# Patient Record
Sex: FEMALE | Race: WHITE | NOT HISPANIC OR LATINO
[De-identification: names, ages, dates, MRNs, and addresses within clinical notes are randomized per-mention and may not be internally consistent; named-entity substitution may affect disease eponyms.]

---

## 2022-05-06 ENCOUNTER — FORM ENCOUNTER (OUTPATIENT)
Age: 77
End: 2022-05-06

## 2022-05-07 ENCOUNTER — APPOINTMENT (OUTPATIENT)
Dept: MRI IMAGING | Facility: CLINIC | Age: 77
End: 2022-05-07
Payer: MEDICARE

## 2022-05-07 ENCOUNTER — APPOINTMENT (OUTPATIENT)
Dept: ORTHOPEDIC SURGERY | Facility: CLINIC | Age: 77
End: 2022-05-07
Payer: MEDICARE

## 2022-05-07 VITALS — HEIGHT: 64 IN | WEIGHT: 168 LBS | BODY MASS INDEX: 28.68 KG/M2

## 2022-05-07 DIAGNOSIS — E11.9 TYPE 2 DIABETES MELLITUS W/OUT COMPLICATIONS: ICD-10-CM

## 2022-05-07 DIAGNOSIS — I10 ESSENTIAL (PRIMARY) HYPERTENSION: ICD-10-CM

## 2022-05-07 DIAGNOSIS — Z00.00 ENCOUNTER FOR GENERAL ADULT MEDICAL EXAMINATION W/OUT ABNORMAL FINDINGS: ICD-10-CM

## 2022-05-07 DIAGNOSIS — E78.00 PURE HYPERCHOLESTEROLEMIA, UNSPECIFIED: ICD-10-CM

## 2022-05-07 PROCEDURE — 73564 X-RAY EXAM KNEE 4 OR MORE: CPT | Mod: LT

## 2022-05-07 PROCEDURE — 99204 OFFICE O/P NEW MOD 45 MIN: CPT | Mod: 25

## 2022-05-07 PROCEDURE — 20610 DRAIN/INJ JOINT/BURSA W/O US: CPT | Mod: LT

## 2022-05-07 PROCEDURE — L1833: CPT

## 2022-05-07 PROCEDURE — 73721 MRI JNT OF LWR EXTRE W/O DYE: CPT | Mod: LT,MH

## 2022-05-07 NOTE — HISTORY OF PRESENT ILLNESS
[Left Leg] : left leg [8] : 8 [5] : 5 [Dull/Aching] : dull/aching [Localized] : localized [Intermittent] : intermittent [Standing] : standing [Walking] : walking [Stairs] : stairs [Retired] : Work status: retired [de-identified] : 76 f felt a pop walking in her house yesterday. unable to ambulate.  [] : Post Surgical Visit: no [FreeTextEntry5] : patient is here with leg pain since 5/6/22\par patient was walking to a room and felt a pop in her leg

## 2022-05-07 NOTE — IMAGING
[de-identified] : Left knee exam: \par \par General: no distress, no ligamentous laxity\par Skin: no significant pertinent finding\par Inspection: \par    Effusion: +\par    Malalignment: (-)\par    Swelling: (-)\par    Quad atrophy: (-)\par    J-sign: (-)\par ROM: \par    0 - 90 degrees of flexion.\par Tenderness: \par    MJLT: +\par    LJLT: +\par    Medial patellar facet tenderness: +\par    Lateral patellar facet tenderness: +\par Stability: \par    Lachman: (-)\par    Varus/Valgus instability: (-)\par    Posterior drawer: (-)\par Additional tests: \par \par Strength: 3/5 Q 5/5 H/TA/GS/EHL\par Neuro: In tact to light touch throughout, DTR's wnl\par Vascularity: Extremity warm and well perfused\par Gait: normal.\par \par \par  [Left] : left knee [All Views] : anteroposterior, lateral, skyline, and anteroposterior standing [FreeTextEntry9] : effusion, mod djd

## 2022-05-07 NOTE — ASSESSMENT
[FreeTextEntry1] : The patient was advised of the diagnosis.  The natural history of the pathology was explained in full. All questions were answered.  The risks and benefits of conservative and interventional treatment alternatives were explained to the patient\par \par sterilly aspited 40cc blood.   plan for ice, knee immobilizer. mri ro occult fracture. ro quad partial tear. fu p mri\par \par The patient was advised that an advanced imaging study (MRI/CT/etc) will be ordered to evaluate potential pathology in the affected area(s).  The patient was further advised to follow up in the office to review the results of the study and determine further management that may be indicated.\par \par

## 2022-05-10 ENCOUNTER — APPOINTMENT (OUTPATIENT)
Dept: ORTHOPEDIC SURGERY | Facility: CLINIC | Age: 77
End: 2022-05-10
Payer: MEDICARE

## 2022-05-10 VITALS — BODY MASS INDEX: 28.68 KG/M2 | WEIGHT: 168 LBS | HEIGHT: 64 IN

## 2022-05-10 PROCEDURE — 99214 OFFICE O/P EST MOD 30 MIN: CPT

## 2022-05-10 RX ORDER — ROSUVASTATIN CALCIUM 20 MG/1
20 TABLET, FILM COATED ORAL
Qty: 90 | Refills: 0 | Status: ACTIVE | COMMUNITY
Start: 2022-03-22

## 2022-05-10 RX ORDER — RAMIPRIL 10 MG/1
10 CAPSULE ORAL
Qty: 90 | Refills: 0 | Status: ACTIVE | COMMUNITY
Start: 2021-03-23

## 2022-05-10 RX ORDER — DICLOFENAC SODIUM 75 MG/1
75 TABLET, DELAYED RELEASE ORAL
Qty: 20 | Refills: 0 | Status: ACTIVE | COMMUNITY
Start: 2022-05-10 | End: 1900-01-01

## 2022-05-10 RX ORDER — AMLODIPINE BESYLATE 2.5 MG/1
2.5 TABLET ORAL
Qty: 90 | Refills: 0 | Status: ACTIVE | COMMUNITY
Start: 2022-03-22

## 2022-05-10 RX ORDER — CLOTRIMAZOLE AND BETAMETHASONE DIPROPIONATE 10; .5 MG/G; MG/G
1-0.05 CREAM TOPICAL
Qty: 45 | Refills: 0 | Status: ACTIVE | COMMUNITY
Start: 2021-12-01

## 2022-05-10 RX ORDER — CYCLOSPORINE 0.5 MG/ML
0.05 EMULSION OPHTHALMIC
Qty: 90 | Refills: 0 | Status: ACTIVE | COMMUNITY
Start: 2022-02-24

## 2022-05-10 RX ORDER — TRIAMTERENE AND HYDROCHLOROTHIAZIDE 25; 37.5 MG/1; MG/1
37.5-25 TABLET ORAL
Qty: 90 | Refills: 0 | Status: ACTIVE | COMMUNITY
Start: 2022-03-22

## 2022-05-10 RX ORDER — METFORMIN ER 500 MG 500 MG/1
500 TABLET ORAL
Qty: 180 | Refills: 0 | Status: ACTIVE | COMMUNITY
Start: 2022-04-29

## 2022-06-10 ENCOUNTER — APPOINTMENT (OUTPATIENT)
Dept: ORTHOPEDIC SURGERY | Facility: CLINIC | Age: 77
End: 2022-06-10
Payer: MEDICARE

## 2022-06-10 VITALS — HEIGHT: 64 IN | BODY MASS INDEX: 28.68 KG/M2 | WEIGHT: 168 LBS

## 2022-06-10 DIAGNOSIS — S89.92XA UNSPECIFIED INJURY OF LEFT LOWER LEG, INITIAL ENCOUNTER: ICD-10-CM

## 2022-06-10 PROCEDURE — 99213 OFFICE O/P EST LOW 20 MIN: CPT

## 2022-06-10 NOTE — HISTORY OF PRESENT ILLNESS
[3] : 3 [0] : 0 [de-identified] : 76 f felt a pop walking in her house yesterday. unable to ambulate. \par \par MRI L knee:1. 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 Tricompartmental chondral loss, joint space narrowing and osteophytes, subchondral edema in the medial compartment and patella laterally with effusion, synovitis, popliteal cyst, and chronic ligament sprains with MCL laxity and extensor mechanism tendinopathy.\par 3. 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 Disproportionate semimembranosus muscle atrophy.\par 5. No acute fracture.\par 6. Patient motion degrades image quality on multiple imaging sequences.\par \par signficiantly imrpoved in PT. mild pain. has pain in the right anteriror knee area.  [FreeTextEntry6] : no pain bc non weight bearing [de-identified] : physical therapy

## 2022-06-10 NOTE — ASSESSMENT
[FreeTextEntry1] : The patient was advised of the diagnosis.  The natural history of the pathology was explained in full. All questions were answered.  The risks and benefits of conservative and interventional treatment alternatives were explained to the patient\par \par The patient was advised that an advanced imaging study (MRI/CT/etc) will be ordered to evaluate potential pathology in the affected area(s).  The patient was further advised to follow up in the office to review the results of the study and determine further management that may be indicated.\par \par Recommend HA injections in bilateral knees in future. PT for now. fu 6wk prn

## 2022-06-10 NOTE — IMAGING
[de-identified] : Left knee exam: \par \par General: no distress, no ligamentous laxity\par Skin: no significant pertinent finding\par Inspection: \par    Effusion: +\par    Malalignment: (-)\par    Swelling: (-)\par    Quad atrophy: (-)\par    J-sign: (-)\par ROM: \par    0 - 90 degrees of flexion.\par Tenderness: \par    MJLT: min\par    LJLT: min\par    Medial patellar facet tenderness: +\par    Lateral patellar facet tenderness: +\par Stability: \par    Lachman: (-)\par    Varus/Valgus instability: (-)\par    Posterior drawer: (-)\par Additional tests: \par \par Strength: 5/5 Q 5/5 H/TA/GS/EHL\par Neuro: In tact to light touch throughout, DTR's wnl\par Vascularity: Extremity warm and well perfused\par Gait: mildly antalgic\par \par \par \par

## 2022-06-14 NOTE — HISTORY OF PRESENT ILLNESS
[0] : 0 [de-identified] : 76 f felt a pop walking in her house yesterday. unable to ambulate. \par \par 5/10/22: here to review mri\par \par MRI L knee:1. 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 Tricompartmental chondral loss, joint space narrowing and osteophytes, subchondral edema in the medial compartment and patella laterally with effusion, synovitis, popliteal cyst, and chronic ligament sprains with MCL laxity and extensor mechanism tendinopathy.\par 3. 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 Disproportionate semimembranosus muscle atrophy.\par 5. No acute fracture.\par 6. Patient motion degrades image quality on multiple imaging sequences. [FreeTextEntry6] : no pain bc non weight bearing

## 2022-06-14 NOTE — ASSESSMENT
[FreeTextEntry1] : The patient was advised of the diagnosis.  The natural history of the pathology was explained in full. All questions were answered.  The risks and benefits of conservative and interventional treatment alternatives were explained to the patient\par \par \par MRI(s) and/or other advanced imaging studies (CT/etc) were reviewed with the patient. Implications of the study(ies) together with the patient's clinical picture were discussed to formulate a working diagnosis and management options were detailed.\par \par PT. diclofenac. walker. fu 6w

## 2022-06-14 NOTE — IMAGING
[de-identified] : Left knee exam: \par \par General: no distress, no ligamentous laxity\par Skin: no significant pertinent finding\par Inspection: \par    Effusion: +\par    Malalignment: (-)\par    Swelling: (-)\par    Quad atrophy: (-)\par    J-sign: (-)\par ROM: \par    0 - 90 degrees of flexion.\par Tenderness: \par    MJLT: +\par    LJLT: +\par    Medial patellar facet tenderness: +\par    Lateral patellar facet tenderness: +\par Stability: \par    Lachman: (-)\par    Varus/Valgus instability: (-)\par    Posterior drawer: (-)\par Additional tests: \par \par Strength: 3/5 Q 5/5 H/TA/GS/EHL\par Neuro: In tact to light touch throughout, DTR's wnl\par Vascularity: Extremity warm and well perfused\par Gait: normal.\par \par \par

## 2022-12-30 ENCOUNTER — APPOINTMENT (OUTPATIENT)
Dept: ORTHOPEDIC SURGERY | Facility: CLINIC | Age: 77
End: 2022-12-30
Payer: MEDICARE

## 2022-12-30 DIAGNOSIS — S83.232D COMPLEX TEAR OF MEDIAL MENISCUS, CURRENT INJURY, LEFT KNEE, SUBSEQUENT ENCOUNTER: ICD-10-CM

## 2022-12-30 PROCEDURE — 20610 DRAIN/INJ JOINT/BURSA W/O US: CPT

## 2022-12-30 PROCEDURE — 99213 OFFICE O/P EST LOW 20 MIN: CPT | Mod: 25

## 2022-12-30 PROCEDURE — 73562 X-RAY EXAM OF KNEE 3: CPT | Mod: RT

## 2022-12-30 NOTE — IMAGING
[de-identified] : Bilatt knee exam: \par \par General: no distress, no ligamentous laxity\par Skin: no significant pertinent finding\par Inspection: \par    Effusion: -\par    Malalignment: (-)\par    Swelling: (-)\par    Quad atrophy: (-)\par    J-sign: (-)\par ROM: \par    0 - 120 degrees of flexion.\par Tenderness: \par    MJLT:+\par    LJLT: min\par    Medial patellar facet tenderness: +\par    Lateral patellar facet tenderness: +\par Stability: \par    Lachman: (-)\par    Varus/Valgus instability: (-)\par    Posterior drawer: (-)\par Additional tests: \par \par Strength: 5/5 Q 5/5 H/TA/GS/EHL\par Neuro: In tact to light touch throughout, DTR's wnl\par Vascularity: Extremity warm and well perfused\par Gait: mildly antalgic\par \par \par \par  [Right] : right knee [All Views] : anteroposterior, lateral, skyline, and anteroposterior standing [advanced tricompartmental OA with medial compartment narrowing and varus alignment] : advanced tricompartmental OA with medial compartment narrowing and varus alignment

## 2022-12-30 NOTE — REASON FOR VISIT
[FreeTextEntry2] : complaining of right knee pain now worsening. hx of right knee pain previously. \par

## 2022-12-30 NOTE — HISTORY OF PRESENT ILLNESS
[Dull/Aching] : dull/aching [de-identified] : 76 f felt a pop walking in her house yesterday. unable to ambulate. \par \par MRI L knee:1. 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 Tricompartmental chondral loss, joint space narrowing and osteophytes, subchondral edema in the medial compartment and patella laterally with effusion, synovitis, popliteal cyst, and chronic ligament sprains with MCL laxity and extensor mechanism tendinopathy.\par 3. 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 Disproportionate semimembranosus muscle atrophy.\par 5. No acute fracture.\par 6. Patient motion degrades image quality on multiple imaging sequences.\par \par signficiantly imrpoved in PT. mild pain. has pain in the right anteriror knee area.  [3] : 3 [0] : 0 [] : no [FreeTextEntry5] : no reported injury, pain has been constant for a few months. [FreeTextEntry1] : right knee [FreeTextEntry6] : no pain bc non weight bearing [de-identified] : physical therapy

## 2022-12-30 NOTE — DISCUSSION/SUMMARY
[de-identified] : \par hx of dm. plan for HA injection to right knee today\par \par \par The patient was advised of the diagnosis.  The natural history of the pathology was explained in full. All questions were answered.  The risks and benefits of conservative and interventional treatment alternatives were explained to the patient\par \par The patient was advised that an advanced imaging study (MRI/CT/etc) will be ordered to evaluate potential pathology in the affected area(s).  The patient was further advised to follow up in the office to review the results of the study and determine further management that may be indicated.\par \par \par ----------------------------------------------------------------------------\par \par Large joint injection given: Hyaluronic acid/viscosupplementation to Right knee\par \par Viscosupplementation injection indications at this time include- X-ray evidence of osteoarthritis on this or prior visits, and patient having tried OTC's including aspirin, Ibuprofen, Aleve etc or prescription NSAIDS, and/or exercises at home and/ or physical therapy without satisfactory response.\par \par The risks, benefits, and alternatives to viscosupplementation injection were explained in full to the patient. Risks outlined include but are not limited to infection, sepsis, bleeding, scarring, skin discoloration, temporary increase in pain, syncopal episode, failure to resolve symptoms, allergic reaction, and symptom recurrence.  Patient understood the risks. All questions were answered. After discussion of options, the patient requested viscosupplementation. \par \par An injection of Hyaluronic acid of appropriate formulation was injected into the joint(s) after verbal consent, using sterile technique. The patient tolerated the procedure well and there were no complications.  Instructed patient to apply ice to the injection site. Signs and symptoms of infection reviewed and patient advised to call immediately for redness, fevers, and/or chills.\par \par

## 2023-01-06 ENCOUNTER — APPOINTMENT (OUTPATIENT)
Dept: ORTHOPEDIC SURGERY | Facility: CLINIC | Age: 78
End: 2023-01-06
Payer: MEDICARE

## 2023-01-06 PROCEDURE — 99024 POSTOP FOLLOW-UP VISIT: CPT

## 2023-01-06 PROCEDURE — 20610 DRAIN/INJ JOINT/BURSA W/O US: CPT | Mod: RT

## 2023-01-06 NOTE — IMAGING
[Right] : right knee [All Views] : anteroposterior, lateral, skyline, and anteroposterior standing [advanced tricompartmental OA with medial compartment narrowing and varus alignment] : advanced tricompartmental OA with medial compartment narrowing and varus alignment [de-identified] : Bilatt knee exam: \par \par General: no distress, no ligamentous laxity\par Skin: no significant pertinent finding\par Inspection: \par    Effusion: -\par    Malalignment: (-)\par    Swelling: (-)\par    Quad atrophy: (-)\par    J-sign: (-)\par ROM: \par    0 - 120 degrees of flexion.\par Tenderness: \par    MJLT:+\par    LJLT: min\par    Medial patellar facet tenderness: +\par    Lateral patellar facet tenderness: +\par Stability: \par    Lachman: (-)\par    Varus/Valgus instability: (-)\par    Posterior drawer: (-)\par Additional tests: \par \par Strength: 5/5 Q 5/5 H/TA/GS/EHL\par Neuro: In tact to light touch throughout, DTR's wnl\par Vascularity: Extremity warm and well perfused\par Gait: mildly antalgic\par \par \par \par

## 2023-01-06 NOTE — DISCUSSION/SUMMARY
[de-identified] : \par orthovisc #2 to right knee. \par \par \par The patient was advised of the diagnosis.  The natural history of the pathology was explained in full. All questions were answered.  The risks and benefits of conservative and interventional treatment alternatives were explained to the patient\par \par The patient was advised that an advanced imaging study (MRI/CT/etc) will be ordered to evaluate potential pathology in the affected area(s).  The patient was further advised to follow up in the office to review the results of the study and determine further management that may be indicated.\par \par \par ----------------------------------------------------------------------------\par \par Large joint injection given: Hyaluronic acid/viscosupplementation to Right knee\par \par Viscosupplementation injection indications at this time include- X-ray evidence of osteoarthritis on this or prior visits, and patient having tried OTC's including aspirin, Ibuprofen, Aleve etc or prescription NSAIDS, and/or exercises at home and/ or physical therapy without satisfactory response.\par \par The risks, benefits, and alternatives to viscosupplementation injection were explained in full to the patient. Risks outlined include but are not limited to infection, sepsis, bleeding, scarring, skin discoloration, temporary increase in pain, syncopal episode, failure to resolve symptoms, allergic reaction, and symptom recurrence.  Patient understood the risks. All questions were answered. After discussion of options, the patient requested viscosupplementation. \par \par An injection of Hyaluronic acid of appropriate formulation was injected into the joint(s) after verbal consent, using sterile technique. The patient tolerated the procedure well and there were no complications.  Instructed patient to apply ice to the injection site. Signs and symptoms of infection reviewed and patient advised to call immediately for redness, fevers, and/or chills.\par \par

## 2023-01-06 NOTE — HISTORY OF PRESENT ILLNESS
[3] : 3 [0] : 0 [Dull/Aching] : dull/aching [2] : 2 [Euflexxa] : Euflexxa [de-identified] : 76 f felt a pop walking in her house yesterday. unable to ambulate. \par \par MRI L knee:1. 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 Tricompartmental chondral loss, joint space narrowing and osteophytes, subchondral edema in the medial compartment and patella laterally with effusion, synovitis, popliteal cyst, and chronic ligament sprains with MCL laxity and extensor mechanism tendinopathy.\par 3. 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 Disproportionate semimembranosus muscle atrophy.\par 5. No acute fracture.\par 6. Patient motion degrades image quality on multiple imaging sequences.\par \par signficiantly imrpoved in PT. mild pain. has pain in the right anteriror knee area. \par 1/6/23: Here for R knee euflexxa #2 [] : no [FreeTextEntry1] : right knee [FreeTextEntry5] : no reported injury, pain has been constant for a few months. [FreeTextEntry6] : no pain bc non weight bearing [de-identified] : physical therapy [de-identified] : 12/30/22

## 2023-01-13 ENCOUNTER — APPOINTMENT (OUTPATIENT)
Dept: ORTHOPEDIC SURGERY | Facility: CLINIC | Age: 78
End: 2023-01-13
Payer: MEDICARE

## 2023-01-13 VITALS — HEIGHT: 64 IN | BODY MASS INDEX: 28.68 KG/M2 | WEIGHT: 168 LBS

## 2023-01-13 PROCEDURE — 99024 POSTOP FOLLOW-UP VISIT: CPT

## 2023-01-13 PROCEDURE — 20610 DRAIN/INJ JOINT/BURSA W/O US: CPT | Mod: RT

## 2023-01-13 NOTE — HISTORY OF PRESENT ILLNESS
[5] : 5 [1] : 2 [Dull/Aching] : dull/aching [3] : 3 [Euflexxa] : Euflexxa [de-identified] : 76 f felt a pop walking in her house yesterday. unable to ambulate. \par \par MRI L knee:1. 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 Tricompartmental chondral loss, joint space narrowing and osteophytes, subchondral edema in the medial compartment and patella laterally with effusion, synovitis, popliteal cyst, and chronic ligament sprains with MCL laxity and extensor mechanism tendinopathy.\par 3. 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 Disproportionate semimembranosus muscle atrophy.\par 5. No acute fracture.\par 6. Patient motion degrades image quality on multiple imaging sequences.\par \par signficiantly imrpoved in PT. mild pain. has pain in the right anteriror knee area. \par 1/6/23: Here for R knee euflexxa #2 [] : Post Surgical Visit: no [FreeTextEntry1] : right knee  [de-identified] : none  [de-identified] : 1/6/23 [de-identified] : right knee  [TWNoteComboBox1] : 30%

## 2023-01-13 NOTE — DISCUSSION/SUMMARY
[de-identified] : \par euflexxa #3 to right knee. \par \par \par The patient was advised of the diagnosis.  The natural history of the pathology was explained in full. All questions were answered.  The risks and benefits of conservative and interventional treatment alternatives were explained to the patient\par \par The patient was advised that an advanced imaging study (MRI/CT/etc) will be ordered to evaluate potential pathology in the affected area(s).  The patient was further advised to follow up in the office to review the results of the study and determine further management that may be indicated.\par \par \par ----------------------------------------------------------------------------\par \par Large joint injection given: Hyaluronic acid/viscosupplementation to Right knee\par \par Viscosupplementation injection indications at this time include- X-ray evidence of osteoarthritis on this or prior visits, and patient having tried OTC's including aspirin, Ibuprofen, Aleve etc or prescription NSAIDS, and/or exercises at home and/ or physical therapy without satisfactory response.\par \par The risks, benefits, and alternatives to viscosupplementation injection were explained in full to the patient. Risks outlined include but are not limited to infection, sepsis, bleeding, scarring, skin discoloration, temporary increase in pain, syncopal episode, failure to resolve symptoms, allergic reaction, and symptom recurrence.  Patient understood the risks. All questions were answered. After discussion of options, the patient requested viscosupplementation. \par \par An injection of Hyaluronic acid of appropriate formulation was injected into the joint(s) after verbal consent, using sterile technique. The patient tolerated the procedure well and there were no complications.  Instructed patient to apply ice to the injection site. Signs and symptoms of infection reviewed and patient advised to call immediately for redness, fevers, and/or chills.\par \par

## 2023-01-13 NOTE — IMAGING
[de-identified] : Bilatt knee exam: \par \par General: no distress, no ligamentous laxity\par Skin: no significant pertinent finding\par Inspection: \par    Effusion: -\par    Malalignment: (-)\par    Swelling: (-)\par    Quad atrophy: (-)\par    J-sign: (-)\par ROM: \par    0 - 120 degrees of flexion.\par Tenderness: \par    MJLT:+\par    LJLT: min\par    Medial patellar facet tenderness: +\par    Lateral patellar facet tenderness: +\par Stability: \par    Lachman: (-)\par    Varus/Valgus instability: (-)\par    Posterior drawer: (-)\par Additional tests: \par \par Strength: 5/5 Q 5/5 H/TA/GS/EHL\par Neuro: In tact to light touch throughout, DTR's wnl\par Vascularity: Extremity warm and well perfused\par Gait: mildly antalgic\par \par \par \par  [Right] : right knee [All Views] : anteroposterior, lateral, skyline, and anteroposterior standing [advanced tricompartmental OA with medial compartment narrowing and varus alignment] : advanced tricompartmental OA with medial compartment narrowing and varus alignment

## 2024-05-13 ENCOUNTER — APPOINTMENT (OUTPATIENT)
Dept: ORTHOPEDIC SURGERY | Facility: CLINIC | Age: 79
End: 2024-05-13
Payer: MEDICARE

## 2024-05-13 PROCEDURE — 20610 DRAIN/INJ JOINT/BURSA W/O US: CPT | Mod: 50

## 2024-05-13 PROCEDURE — 99213 OFFICE O/P EST LOW 20 MIN: CPT | Mod: 25

## 2024-05-13 PROCEDURE — 73562 X-RAY EXAM OF KNEE 3: CPT | Mod: 50

## 2024-05-13 NOTE — DISCUSSION/SUMMARY
[de-identified] : Discussed options, Plan for b/l knee euflexxa PT rx  Medication injected: Euflexxa dose: 20 mg/2ml injection intraarticularly in each noted joint    f/u 1 weeks ot continue series ----------------------------------------------------------------------------  Large joint injections given: Hyaluronic acid/viscosupplementation to Bilateral knees  Viscosupplementation injection indications at this time include- X-ray evidence of osteoarthritis on this or prior visits, and patient having tried OTC's including aspirin, Ibuprofen, Aleve etc or prescription NSAIDS, and/or exercises at home and/ or physical therapy without satisfactory response.  The risks, benefits, and alternatives to viscosupplementation injection were explained in full to the patient. Risks outlined include but are not limited to infection, sepsis, bleeding, scarring, skin discoloration, temporary increase in pain, syncopal episode, failure to resolve symptoms, allergic reaction, and symptom recurrence.  Patient understood the risks. All questions were answered. After discussion of options, the patient requested viscosupplementation.   An injection of Hyaluronic acid of appropriate formulation was injected into the joint(s) after verbal consent, using sterile technique. The patient tolerated the procedure well and there were no complications.  Instructed patient to apply ice to the injection site. Signs and symptoms of infection reviewed and patient advised to call immediately for redness, fevers, and/or chills.  ----------------------------------------------------------------------------   All relevant imaging studies pertinent to today's visit, including x-rays, MRI's and/or other advanced imaging studies (CT/etc) were independently interpreted and reviewed with the patient as needed. Implications of the studies together with the patient's clinical picture were discussed to formulate a working diagnosis and management options were detailed.   The patient and/or guardian was advised of the diagnosis.  The natural history of the pathology was explained in full. All questions were answered.  The risks and benefits of conservative and interventional treatment alternatives were explained to the patient  The patient and/or guardian was advised if any advanced diagnostic/imaging study (MRI/CT/etc) is ordered to evaluate potential pathology in the affected area(s), they should follow up in the office to review the results of the study and determine further management that may be indicated.

## 2024-05-13 NOTE — HISTORY OF PRESENT ILLNESS
[de-identified] : This is Ms. PRUDENCIO GENAO  a 78 year old female who comes in today complaining of b/l knee pain. Saw Dr Ward in 2023, had right knee meniscus tear, had HA which gave decent relief. Now has b/l knee pain. Mostly when walking. [FreeTextEntry1] : b/l knees

## 2024-05-13 NOTE — IMAGING
[Bilateral] : knee bilaterally [All Views] : anteroposterior, lateral, skyline, and anteroposterior standing [advanced tricompartmental OA with medial compartment narrowing and varus alignment] : advanced tricompartmental OA with medial compartment narrowing and varus alignment [advanced tricompartmental OA with lateral compartment narrowing and valgus alignment] : advanced tricompartmental OA with lateral compartment narrowing and valgus alignment [de-identified] :   ----------------------------------------------------------------------------   Bilateral knee exam:   Skin: no significant pertinent finding Inspection:      (+mild) Effusion      (neg) Malalignment      (neg) Swelling      (neg) Quad atrophy      (neg) J-sign ROM:      0 - 120 degrees of flexion. Tenderness:      (neg) MJLT      (neg) LJLT      (neg) Medial patellar facet tenderness      (neg) Lateral patellar facet tenderness      (neg) Crepitus      (neg) Patellar grind tenderness      (neg) Patellar tendon      (neg) Quad tendon      Other: Stability:      (neg) Lachman      (neg) Varus/Valgus instability      (neg) Posterior drawer      (neg) Patellar translation: wnl Additional tests:      (neg) McMurrays test      (neg) Patellar apprehension      Other: Strength: 5/5 Q/H/TA/GS/EHL Neuro: In tact to light touch throughout, DTR's wnl Vascularity: Extremity warm and well perfused Gait: normal

## 2024-05-20 ENCOUNTER — APPOINTMENT (OUTPATIENT)
Dept: ORTHOPEDIC SURGERY | Facility: CLINIC | Age: 79
End: 2024-05-20
Payer: MEDICARE

## 2024-05-20 PROCEDURE — 99024 POSTOP FOLLOW-UP VISIT: CPT

## 2024-05-20 PROCEDURE — 20610 DRAIN/INJ JOINT/BURSA W/O US: CPT | Mod: 50

## 2024-05-20 NOTE — IMAGING
[Bilateral] : knee bilaterally [All Views] : anteroposterior, lateral, skyline, and anteroposterior standing [advanced tricompartmental OA with medial compartment narrowing and varus alignment] : advanced tricompartmental OA with medial compartment narrowing and varus alignment [advanced tricompartmental OA with lateral compartment narrowing and valgus alignment] : advanced tricompartmental OA with lateral compartment narrowing and valgus alignment [de-identified] :   ----------------------------------------------------------------------------   Bilateral knee exam:   Skin: no significant pertinent finding Inspection:      (+mild) Effusion      (neg) Malalignment      (neg) Swelling      (neg) Quad atrophy      (neg) J-sign ROM:      0 - 120 degrees of flexion. Tenderness:      (neg) MJLT      (neg) LJLT      (neg) Medial patellar facet tenderness      (neg) Lateral patellar facet tenderness      (neg) Crepitus      (neg) Patellar grind tenderness      (neg) Patellar tendon      (neg) Quad tendon      Other: Stability:      (neg) Lachman      (neg) Varus/Valgus instability      (neg) Posterior drawer      (neg) Patellar translation: wnl Additional tests:      (neg) McMurrays test      (neg) Patellar apprehension      Other: Strength: 5/5 Q/H/TA/GS/EHL Neuro: In tact to light touch throughout, DTR's wnl Vascularity: Extremity warm and well perfused Gait: normal

## 2024-05-20 NOTE — HISTORY OF PRESENT ILLNESS
[de-identified] : This is Ms. PRUDENCIO GENAO  a 78 year old female who comes in today complaining of b/l knee pain. Saw Dr Ward in 2023, had right knee meniscus tear, had HA which gave decent relief. Now has b/l knee pain. Mostly when walking. [FreeTextEntry1] : b/l knees

## 2024-05-20 NOTE — DISCUSSION/SUMMARY
[de-identified] : Discussed options, Plan for b/l knee euflexxa ASP R knee 20 cc nsf, L knee 40 cc nsf PT rx Medication injected: Euflexxa dose: 20 mg/2ml injection intraarticularly in each noted joint f/u 1 week ----------------------------------------------------------------------------  Large joint injections given: Hyaluronic acid/viscosupplementation to Bilateral knees  Viscosupplementation injection indications at this time include- X-ray evidence of osteoarthritis on this or prior visits, and patient having tried OTC's including aspirin, Ibuprofen, Aleve etc or prescription NSAIDS, and/or exercises at home and/ or physical therapy without satisfactory response.  The risks, benefits, and alternatives to viscosupplementation injection were explained in full to the patient. Risks outlined include but are not limited to infection, sepsis, bleeding, scarring, skin discoloration, temporary increase in pain, syncopal episode, failure to resolve symptoms, allergic reaction, and symptom recurrence.  Patient understood the risks. All questions were answered. After discussion of options, the patient requested viscosupplementation.   An injection of Hyaluronic acid of appropriate formulation was injected into the joint(s) after verbal consent, using sterile technique. The patient tolerated the procedure well and there were no complications.  Instructed patient to apply ice to the injection site. Signs and symptoms of infection reviewed and patient advised to call immediately for redness, fevers, and/or chills.  ----------------------------------------------------------------------------   All relevant imaging studies pertinent to today's visit, including x-rays, MRI's and/or other advanced imaging studies (CT/etc) were independently interpreted and reviewed with the patient as needed. Implications of the studies together with the patient's clinical picture were discussed to formulate a working diagnosis and management options were detailed.   The patient and/or guardian was advised of the diagnosis.  The natural history of the pathology was explained in full. All questions were answered.  The risks and benefits of conservative and interventional treatment alternatives were explained to the patient  The patient and/or guardian was advised if any advanced diagnostic/imaging study (MRI/CT/etc) is ordered to evaluate potential pathology in the affected area(s), they should follow up in the office to review the results of the study and determine further management that may be indicated.

## 2024-05-28 ENCOUNTER — APPOINTMENT (OUTPATIENT)
Dept: ORTHOPEDIC SURGERY | Facility: CLINIC | Age: 79
End: 2024-05-28
Payer: MEDICARE

## 2024-05-28 DIAGNOSIS — M17.12 UNILATERAL PRIMARY OSTEOARTHRITIS, LEFT KNEE: ICD-10-CM

## 2024-05-28 DIAGNOSIS — M17.11 UNILATERAL PRIMARY OSTEOARTHRITIS, RIGHT KNEE: ICD-10-CM

## 2024-05-28 PROCEDURE — 20610 DRAIN/INJ JOINT/BURSA W/O US: CPT | Mod: 50

## 2024-05-28 PROCEDURE — 99024 POSTOP FOLLOW-UP VISIT: CPT

## 2024-05-28 NOTE — HISTORY OF PRESENT ILLNESS
[de-identified] : This is Ms. PRUDENCIO GENAO  a 78 year old female who comes in today complaining of b/l knee pain. Saw Dr Ward in 2023, had right knee meniscus tear, had HA which gave decent relief. Now has b/l knee pain. Mostly when walking.  [FreeTextEntry1] : b/l knees

## 2024-05-28 NOTE — IMAGING
[Bilateral] : knee bilaterally [All Views] : anteroposterior, lateral, skyline, and anteroposterior standing [advanced tricompartmental OA with medial compartment narrowing and varus alignment] : advanced tricompartmental OA with medial compartment narrowing and varus alignment [advanced tricompartmental OA with lateral compartment narrowing and valgus alignment] : advanced tricompartmental OA with lateral compartment narrowing and valgus alignment [de-identified] :   ----------------------------------------------------------------------------   Bilateral knee exam:   Skin: no significant pertinent finding Inspection:      (+mild) Effusion      (neg) Malalignment      (neg) Swelling      (neg) Quad atrophy      (neg) J-sign ROM:      0 - 120 degrees of flexion. Tenderness:      (neg) MJLT      (neg) LJLT      (neg) Medial patellar facet tenderness      (neg) Lateral patellar facet tenderness      (neg) Crepitus      (neg) Patellar grind tenderness      (neg) Patellar tendon      (neg) Quad tendon      Other: Stability:      (neg) Lachman      (neg) Varus/Valgus instability      (neg) Posterior drawer      (neg) Patellar translation: wnl Additional tests:      (neg) McMurrays test      (neg) Patellar apprehension      Other: Strength: 5/5 Q/H/TA/GS/EHL Neuro: In tact to light touch throughout, DTR's wnl Vascularity: Extremity warm and well perfused Gait: normal

## 2024-05-28 NOTE — DISCUSSION/SUMMARY
[de-identified] : Discussed options, Plan for b/l knee euflexxa ASP R knee 10 cc nsf, L knee 30 cc nsf PT rx Medication injected: Euflexxa dose: 20 mg/2ml injection intraarticularly in each noted joint f/u prn refer to evelin for atraumatic wrist/ thumb pain  ----------------------------------------------------------------------------  Large joint injections given: Hyaluronic acid/viscosupplementation to Bilateral knees  Viscosupplementation injection indications at this time include- X-ray evidence of osteoarthritis on this or prior visits, and patient having tried OTC's including aspirin, Ibuprofen, Aleve etc or prescription NSAIDS, and/or exercises at home and/ or physical therapy without satisfactory response.  The risks, benefits, and alternatives to viscosupplementation injection were explained in full to the patient. Risks outlined include but are not limited to infection, sepsis, bleeding, scarring, skin discoloration, temporary increase in pain, syncopal episode, failure to resolve symptoms, allergic reaction, and symptom recurrence.  Patient understood the risks. All questions were answered. After discussion of options, the patient requested viscosupplementation.   An injection of Hyaluronic acid of appropriate formulation was injected into the joint(s) after verbal consent, using sterile technique. The patient tolerated the procedure well and there were no complications.  Instructed patient to apply ice to the injection site. Signs and symptoms of infection reviewed and patient advised to call immediately for redness, fevers, and/or chills.  ----------------------------------------------------------------------------   All relevant imaging studies pertinent to today's visit, including x-rays, MRI's and/or other advanced imaging studies (CT/etc) were independently interpreted and reviewed with the patient as needed. Implications of the studies together with the patient's clinical picture were discussed to formulate a working diagnosis and management options were detailed.   The patient and/or guardian was advised of the diagnosis.  The natural history of the pathology was explained in full. All questions were answered.  The risks and benefits of conservative and interventional treatment alternatives were explained to the patient  The patient and/or guardian was advised if any advanced diagnostic/imaging study (MRI/CT/etc) is ordered to evaluate potential pathology in the affected area(s), they should follow up in the office to review the results of the study and determine further management that may be indicated.   Progress note completed by Reggie Vergara PA-C working as a scribe for Dr Ward

## 2024-06-04 ENCOUNTER — APPOINTMENT (OUTPATIENT)
Dept: ORTHOPEDIC SURGERY | Facility: CLINIC | Age: 79
End: 2024-06-04
Payer: MEDICARE

## 2024-06-04 VITALS — BODY MASS INDEX: 28.35 KG/M2 | HEIGHT: 63 IN | WEIGHT: 160 LBS

## 2024-06-04 DIAGNOSIS — M18.11 UNILATERAL PRIMARY OSTEOARTHRITIS OF FIRST CARPOMETACARPAL JOINT, RIGHT HAND: ICD-10-CM

## 2024-06-04 PROCEDURE — 99213 OFFICE O/P EST LOW 20 MIN: CPT

## 2024-06-04 PROCEDURE — 73130 X-RAY EXAM OF HAND: CPT | Mod: RT

## 2024-06-04 NOTE — HISTORY OF PRESENT ILLNESS
[de-identified] : 06/04/2024 PRUDENCIO GENAO is a 78 year-old female here today for: right wrist  Location:  right wrist  Complaint: The patient presents to the office today for evaluation of right basilar joint pain.  She notes the insidious onset of pain at the base of her thumb beginning roughly 2 weeks ago without antecedent event.  She notes intermittent pain associated with key pinch and tight .  No injuries that she can recall.  No numbness or tingling. Symptom onset: 5/21/24  Prior treatments:  none  Hand Dominance: left  Occupation: retired  PMH: diabetes (A1c 6.5) ,high cholesterol  Allergies: NKDA [FreeTextEntry1] : RT WRIST

## 2024-06-04 NOTE — DISCUSSION/SUMMARY
[de-identified] : The patient was advised of the diagnosis. The natural history of the pathology was explained in full to the patient in layman's terms. We reviewed that the forces applied to the thumb are magnified 12-14 times at the thumb cmc joint.  We also reviewed the progression of arthritis with early arthritis showing minimal to no xray changes.  We discussed treatment options including activity modifications (demonstrated), medicine (topical and oral), bracing, therapy, injections and surgery.  We reviewed the risks and benefits of each as well as the postop expectations/course.  All questions were answered and the patient verbalized understanding.  At this time the patient would like to continue with bracing and anti-inflammatories.  She will discuss corticosteroid injections with her endocrinologist and will return on an as-needed basis.

## 2024-06-04 NOTE — IMAGING
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

## 2024-06-18 ENCOUNTER — APPOINTMENT (OUTPATIENT)
Dept: ORTHOPEDIC SURGERY | Facility: CLINIC | Age: 79
End: 2024-06-18